# Patient Record
Sex: MALE | Race: WHITE
[De-identification: names, ages, dates, MRNs, and addresses within clinical notes are randomized per-mention and may not be internally consistent; named-entity substitution may affect disease eponyms.]

---

## 2021-07-01 ENCOUNTER — HOSPITAL ENCOUNTER (EMERGENCY)
Dept: HOSPITAL 63 - ER | Age: 36
Discharge: HOME | End: 2021-07-01
Payer: COMMERCIAL

## 2021-07-01 VITALS — SYSTOLIC BLOOD PRESSURE: 157 MMHG | DIASTOLIC BLOOD PRESSURE: 104 MMHG

## 2021-07-01 VITALS — BODY MASS INDEX: 34.52 KG/M2 | WEIGHT: 268.96 LBS | HEIGHT: 74 IN

## 2021-07-01 DIAGNOSIS — R42: ICD-10-CM

## 2021-07-01 DIAGNOSIS — R55: Primary | ICD-10-CM

## 2021-07-01 LAB
ALBUMIN SERPL-MCNC: 4.1 G/DL (ref 3.4–5)
ALBUMIN/GLOB SERPL: 1.2 {RATIO} (ref 1–1.7)
ALP SERPL-CCNC: 91 U/L (ref 46–116)
ALT SERPL-CCNC: 57 U/L (ref 16–63)
ANION GAP SERPL CALC-SCNC: 13 MMOL/L (ref 6–14)
APTT PPP: YELLOW S
AST SERPL-CCNC: 21 U/L (ref 15–37)
BACTERIA #/AREA URNS HPF: 0 /HPF
BASOPHILS # BLD AUTO: 0.1 X10^3/UL (ref 0–0.2)
BASOPHILS NFR BLD: 1 % (ref 0–3)
BILIRUB SERPL-MCNC: 0.4 MG/DL (ref 0.2–1)
BILIRUB UR QL STRIP: (no result)
BUN/CREAT SERPL: 18 (ref 6–20)
CA-I SERPL ISE-MCNC: 18 MG/DL (ref 8–26)
CALCIUM SERPL-MCNC: 8.9 MG/DL (ref 8.5–10.1)
CHLORIDE SERPL-SCNC: 105 MMOL/L (ref 98–107)
CO2 SERPL-SCNC: 23 MMOL/L (ref 21–32)
CREAT SERPL-MCNC: 1 MG/DL (ref 0.7–1.3)
EOSINOPHIL NFR BLD: 0.4 X10^3/UL (ref 0–0.7)
EOSINOPHIL NFR BLD: 5 % (ref 0–3)
ERYTHROCYTE [DISTWIDTH] IN BLOOD BY AUTOMATED COUNT: 13 % (ref 11.5–14.5)
FIBRINOGEN PPP-MCNC: CLEAR MG/DL
GFR SERPLBLD BASED ON 1.73 SQ M-ARVRAT: 84.1 ML/MIN
GLOBULIN SER-MCNC: 3.3 G/DL (ref 2.2–3.8)
GLUCOSE SERPL-MCNC: 119 MG/DL (ref 70–99)
GLUCOSE UR STRIP-MCNC: (no result) MG/DL
HCT VFR BLD CALC: 43.5 % (ref 39–53)
HGB BLD-MCNC: 15 G/DL (ref 13–17.5)
LYMPHOCYTES # BLD: 1.6 X10^3/UL (ref 1–4.8)
LYMPHOCYTES NFR BLD AUTO: 20 % (ref 24–48)
MCH RBC QN AUTO: 31 PG (ref 25–35)
MCHC RBC AUTO-ENTMCNC: 35 G/DL (ref 31–37)
MCV RBC AUTO: 91 FL (ref 79–100)
MONO #: 0.8 X10^3/UL (ref 0–1.1)
MONOCYTES NFR BLD: 10 % (ref 0–9)
NEUT #: 5.2 X10^3UL (ref 1.8–7.7)
NEUTROPHILS NFR BLD AUTO: 65 % (ref 31–73)
NITRITE UR QL STRIP: (no result)
PLATELET # BLD AUTO: 372 X10^3/UL (ref 140–400)
POTASSIUM SERPL-SCNC: 3.3 MMOL/L (ref 3.5–5.1)
PROT SERPL-MCNC: 7.4 G/DL (ref 6.4–8.2)
RBC # BLD AUTO: 4.81 X10^6/UL (ref 4.3–5.7)
RBC #/AREA URNS HPF: 0 /HPF (ref 0–2)
SODIUM SERPL-SCNC: 141 MMOL/L (ref 136–145)
SP GR UR STRIP: >=1.03
SQUAMOUS #/AREA URNS LPF: (no result) /LPF
UROBILINOGEN UR-MCNC: 0.2 MG/DL
WBC # BLD AUTO: 8 X10^3/UL (ref 4–11)
WBC #/AREA URNS HPF: 0 /HPF (ref 0–4)

## 2021-07-01 PROCEDURE — 93005 ELECTROCARDIOGRAM TRACING: CPT

## 2021-07-01 PROCEDURE — 85025 COMPLETE CBC W/AUTO DIFF WBC: CPT

## 2021-07-01 PROCEDURE — 96360 HYDRATION IV INFUSION INIT: CPT

## 2021-07-01 PROCEDURE — 82550 ASSAY OF CK (CPK): CPT

## 2021-07-01 PROCEDURE — 99285 EMERGENCY DEPT VISIT HI MDM: CPT

## 2021-07-01 PROCEDURE — 84484 ASSAY OF TROPONIN QUANT: CPT

## 2021-07-01 PROCEDURE — 81001 URINALYSIS AUTO W/SCOPE: CPT

## 2021-07-01 PROCEDURE — 36415 COLL VENOUS BLD VENIPUNCTURE: CPT

## 2021-07-01 PROCEDURE — 71045 X-RAY EXAM CHEST 1 VIEW: CPT

## 2021-07-01 PROCEDURE — 80053 COMPREHEN METABOLIC PANEL: CPT

## 2021-07-01 RX ADMIN — SODIUM CHLORIDE, SODIUM LACTATE, POTASSIUM CHLORIDE, AND CALCIUM CHLORIDE ONE MLS/HR: .6; .31; .03; .02 INJECTION, SOLUTION INTRAVENOUS at 19:34

## 2021-07-01 RX ADMIN — DEXAMETHASONE ONE MG: 4 TABLET ORAL at 21:33

## 2021-07-01 NOTE — PHYS DOC
Adult General


HPI


HPI


Patient is an otherwise healthy 36-year-old male who presents with a chief 

complaint of syncope.  States he was at work couple hours before coming to the 

emergency department, working at the half-way and was sitting in the chair most of

the day, stood up, got lightheaded and fell backwards in the chair and then 

backwards onto the ground.  States his coworkers were there and this was 

witnessed and he woke back up a few seconds later.  States that when he woke up 

he was not confused did not have a headache, did not have any changes in vision,

no neck pain, no chest pain no shortness of breath, no abdominal pain, nausea, 

vomiting.  Denies any numbness/weakness/tingling.  States that he feels back to 

normal now.  Denies any recent traumas, travels, illnesses, fevers.  Denies any 

alcohol or drug use.  States he is making stool and urine normally for him.  

States he is eating and drinking normally for him.  Has any cardiac history in 

himself or in his family around his age.





Review of Systems


Review of Systems


Review of systems otherwise unremarkable except noted in HPI





Physical Exam


Physical Exam





Constitutional: Well developed, well nourished, no acute distress, non-toxic 

appearance. []


HENT: Normocephalic, atraumatic, bilateral external ears normal, oropharynx 

moist, no oral exudates, nose normal. []


Eyes: PERRLA, EOMI, conjunctiva normal, no discharge. [] 


Neck: Normal range of motion, no tenderness, supple, no stridor. [] 


Cardiovascular: Sinus tachycardia initially


Lungs & Thorax:  Bilateral breath sounds clear to auscultation []


Abdomen:  soft, no tenderness, no masses, no pulsatile masses. [] 


Skin: Warm, dry, no erythema, no rash. [] 


Back: No tenderness, no CVA tenderness. [] 


Extremities: No tenderness, no cyanosis, no clubbing, ROM intact, no edema. [] 


Neurologic: Alert and oriented X 3, normal motor function, normal sensory 

function, able to sit, stand and walk without issue no focal deficits noted. []


Psychologic: Affect normal, judgement normal, mood normal. []





EKG


EKG


[]





Radiology/Procedures


Radiology/Procedures


[]





Heart Score


C/O Chest Pain:  No


Risk Factors:


Risk Factors:  DM, Current or recent (<one month) smoker, HTN, HLP, family 

history of CAD, obesity.


Risk Scores:


Risk Factors:  DM, Current or recent (<one month) smoker, HTN, HLP, family 

history of CAD, obesity.





Course & Med Decision Making


Course & Med Decision Making


Patient is a 36-year-old male who presents with syncope.


Signs notable initially for tachycardia, sinus.  After little time in the 

emergency department, vital signs normalized.  Physical exam noted above.


EKG noted above with sinus tachycardia but no STEMI.  Repeat EKG with no STEMI 

and no sinus tachycardia.  Troponin normal.  [place] syncope rule is low 

risk.  [place] syncope risk score of 0 as well.


Laboratory analysis not concerning.  Patient able to take p.o. without issue in 

the ED.  Patient otherwise asymptomatic now in the ED and is ready to be 

discharged home.


Discussed differential diagnosis with patient and family and advised to call 

primary care physician first thing the morning to update on ED visit and set up 

a follow-up as soon as possible.


Gave strict return precautions to the ED.  Family grateful, verbalized 

understanding and agreed with plan of discharge.





Dragon Disclaimer


Dragon Disclaimer


This electronic medical record was generated, in whole or in part, using a voice

 recognition dictation system.





Departure


Departure:


Impression:  


   Primary Impression:  


   Syncope


Disposition:  01 HOME / SELF CARE / HOMELESS


Condition:  GOOD


Referrals:  


OSWALDO HOPKINS MD


Patient Instructions:  Syncope





Additional Instructions:  


Thank you for coming into the emergency department and allowing us to take care 

of you.  Please read all the attached information very carefully to go back over

 what we discussed.  As discussed, here in the emergency department you appear 

well, with normal vital signs, and a nonconcerning lab and EKGs looking at your 

heart.  However, just because here in the emergency department you look well 

that does not necessarily mean that you do not have something else going on that

 needs to be reevaluated and further evaluated by your primary care physician as

 discussed.  Please call your primary care physician first thing in the morning 

to update on your ED visit and set up a follow-up visit as soon as possible.  If

 you do not have a primary care physician you could call the one at the number 

provided.  Please come back to the emergency department immediately with new or 

concerning symptoms as discussed.











YUDELKA ROQUE MD                Jul 1, 2021 18:44

## 2021-07-01 NOTE — EKG
[facility] [address], [place] [address]

Test Date:    2021               Test Time:    20:34:30

Pat Name:     JOSHUA DHILLON       Department:   

Patient ID:   [id number]           Room:          

Gender:       M                        Technician:   

:          1985               Requested By: YUDELKA ROQUE

Order Number: [id number]            Reading MD:     

                                 Measurements

Intervals                              Axis          

Rate:         85                       P:            17

MO:           162                      QRS:          -4

QRSD:         100                      T:            2

QT:           360                                    

QTc:          434                                    

                           Interpretive Statements

SINUS RHYTHM

LEFTWARD AXIS

R-S TRANSITION ZONE IN V LEADS DISPLACED TO THE LEFT

OTHERWISE NORMAL ECG

RI6.02

No previous ECG available for comparison

## 2021-07-01 NOTE — EKG
[facility] [address], [place] [address]

Test Date:    2021               Test Time:    18:56:53

Pat Name:     JOSHUA DHILLON       Department:   

Patient ID:   [id number]           Room:          

Gender:       M                        Technician:   

:          1985               Requested By: UYDELKA ROQUE

Order Number: [id number]            Reading MD:     

                                 Measurements

Intervals                              Axis          

Rate:         105                      P:            10

NM:           148                      QRS:          0

QRSD:         94                       T:            16

QT:           334                                    

QTc:          445                                    

                           Interpretive Statements

SINUS TACHYCARDIA

LEFT ATRIAL ABNORMALITY

LEFTWARD AXIS

R-S TRANSITION ZONE IN V LEADS DISPLACED TO THE LEFT

INCOMPLETE RIGHT BUNDLE BRANCH BLOCK

ABNORMAL ECG

RI6.02

No previous ECG available for comparison